# Patient Record
Sex: MALE | Race: WHITE | NOT HISPANIC OR LATINO | Employment: OTHER | URBAN - METROPOLITAN AREA
[De-identification: names, ages, dates, MRNs, and addresses within clinical notes are randomized per-mention and may not be internally consistent; named-entity substitution may affect disease eponyms.]

---

## 2017-04-24 ENCOUNTER — OFFICE VISIT (OUTPATIENT)
Dept: PODIATRY | Facility: CLINIC | Age: 42
End: 2017-04-24
Payer: OTHER GOVERNMENT

## 2017-04-24 VITALS — WEIGHT: 212.31 LBS | HEIGHT: 70 IN | BODY MASS INDEX: 30.39 KG/M2

## 2017-04-24 DIAGNOSIS — M92.61 HAGLUND'S DEFORMITY OF BOTH HEELS: ICD-10-CM

## 2017-04-24 DIAGNOSIS — M92.62 HAGLUND'S DEFORMITY OF BOTH HEELS: ICD-10-CM

## 2017-04-24 DIAGNOSIS — M79.671 HEEL PAIN, BILATERAL: Primary | ICD-10-CM

## 2017-04-24 DIAGNOSIS — M79.672 HEEL PAIN, BILATERAL: Primary | ICD-10-CM

## 2017-04-24 DIAGNOSIS — M21.6X9 EQUINUS DEFORMITY OF FOOT: ICD-10-CM

## 2017-04-24 PROCEDURE — 99204 OFFICE O/P NEW MOD 45 MIN: CPT | Mod: S$PBB,,, | Performed by: PODIATRIST

## 2017-04-24 PROCEDURE — 99999 PR PBB SHADOW E&M-NEW PATIENT-LVL III: CPT | Mod: PBBFAC,,, | Performed by: PODIATRIST

## 2017-04-24 PROCEDURE — 99203 OFFICE O/P NEW LOW 30 MIN: CPT | Mod: PBBFAC,PO | Performed by: PODIATRIST

## 2017-04-24 RX ORDER — MELOXICAM 15 MG/1
15 TABLET ORAL DAILY
COMMUNITY
Start: 2017-03-30 | End: 2019-01-31

## 2017-04-24 NOTE — PROGRESS NOTES
Subjective:      Patient ID: Joseph Vera is a 41 y.o. male.    Chief Complaint: Heel Pain (Hx. diag of heel spurs to both feet)  Patient presents to clinic with the chief complaint of pain in the Rt. Posterior heel with an onset > 1 year.  Describes pain as sharp and currently rates as a 4/10.  States symptoms are exacerbated with the first couple of steps after periods of rest.  Symptoms are alleviated with rest.  Denies definitive injury to the affected extremity.  Attempts to stretch the calf muscle on the affected side, as he was previously diagnosed with heel spurs.  Relates having a job where running is a part of his daily routine, further exacerbating current symptoms.   Denies any additional pedal complaints.  \    History reviewed. No pertinent past medical history.    History reviewed. No pertinent surgical history.    History reviewed. No pertinent family history.    Social History     Social History    Marital status:      Spouse name: N/A    Number of children: N/A    Years of education: N/A     Social History Main Topics    Smoking status: None    Smokeless tobacco: None    Alcohol use None    Drug use: None    Sexual activity: Not Asked     Other Topics Concern    None     Social History Narrative    None       Current Outpatient Prescriptions   Medication Sig Dispense Refill    meloxicam (MOBIC) 15 MG tablet Take 15 mg by mouth once daily.       No current facility-administered medications for this visit.        Review of patient's allergies indicates:  No Known Allergies    Review of Systems   Constitution: Negative for chills and fever.   Cardiovascular: Negative for claudication and leg swelling.   Skin: Negative for color change, dry skin and nail changes.   Musculoskeletal: Positive for myalgias. Negative for arthritis, joint pain and joint swelling.   Neurological: Negative for numbness and paresthesias.   Psychiatric/Behavioral: Negative for altered mental status.            Objective:      Physical Exam   Constitutional: He is oriented to person, place, and time. He appears well-developed and well-nourished. No distress.   Cardiovascular:   Pulses:       Dorsalis pedis pulses are 2+ on the right side, and 2+ on the left side.        Posterior tibial pulses are 2+ on the right side, and 2+ on the left side.   CFT <3 seconds bilateral.  Pedal hair growth present bilateral.   No varicosities noted bilateral.  No bilateral lower extremity edema.   Musculoskeletal: He exhibits tenderness. He exhibits no edema.   Muscle strength 5/5 in all muscle groups bilateral.  No tenderness nor crepitation with ROM of foot/ankle joints bilateral.  Osseous prominence noted to the posterior superior aspect of bilateral heel.  Pain with palpation of the posterior lateral aspect of the Rt. Calcaneus.  No pain at the insertion of the corresponding Achilles tendon nor palpable defect or dell noted within the tendon.  Bilateral pes planus foot type. Bilateral gastrocnemius equinus.      Neurological: He is alert and oriented to person, place, and time. He has normal strength. No sensory deficit.   Light touch intact bilateral.    Skin: Skin is warm, dry and intact. No abrasion, no bruising, no burn, no ecchymosis, no laceration, no lesion, no petechiae and no rash noted. He is not diaphoretic. No cyanosis or erythema. No pallor. Nails show no clubbing.   Pedal skin has normal turgor, temperature, and texture bilateral.  Toenails x 10 appear normotrophic. Examination of the skin reveals no evidence of significant maceration, rashes, open lesions, suspicious appearing nevi or other concerning lesions.              Assessment:       Encounter Diagnoses   Name Primary?    Heel pain, bilateral Yes    Equinus deformity of foot     Deyvi's deformity of both heels          Plan:       Joseph was seen today for heel pain.    Diagnoses and all orders for this visit:    Heel pain, bilateral    Equinus deformity of  foot    Deyvi's deformity of both heels      I counseled the patient on his conditions, their implications and medical management.    Given verbal and written instructions regarding stretching exercises to help reduce equinus deformity.     Fitted and dispensed heel lifts to be worn bilateral to offset equinus.     Advised to rest and ice the affected heel up to 20 minutes daily.     Instructed to take ibuprofen prn for pain. May also apply a topical analgesic to the posterior heel.      Avoidance of barefoot walking, flats, and slippers as this will exacerbate symptoms.     Avoidance of squatting, stooping, and running as these activities will exacerbate symptoms.    RTC in 6 weeks for follow up.    Theodore Olivia DPM

## 2017-04-24 NOTE — MR AVS SNAPSHOT
"    [facility]  [address] [facility] [address]  [place] [address]  Phone: [phone]                  Joseph Vera   2017 8:00 AM   Office Visit    Description:  Male : 1975   Provider:  Theodore Olivia DPM   Department:  [facility]           Reason for Visit     Heel Pain           Diagnoses this Visit        Comments    Heel pain, bilateral    -  Primary     Equinus deformity of foot         Deyvi's deformity of both heels                To Do List           Future Appointments        Provider Department Dept Phone    2017 8:00 AM Theodore Olivia DPM [facility] [phone]      Goals (5 Years of Data)     None      Follow-Up and Disposition     Return in about 6 weeks (around 2017).      [facility]     [facility] Nurse Care Line -  Assistance  Unless otherwise directed by your provider, please contact [facility], our nurse care line that is available for  assistance.     Registered nurses in the [facility] provide: appointment scheduling, clinical advisement, health education, and other advisory services.  Call: [phone] (toll free)               Medications           Message regarding Medications     Verify the changes and/or additions to your medication regime listed below are the same as discussed with your clinician today.  If any of these changes or additions are incorrect, please notify your healthcare provider.             Verify that the below list of medications is an accurate representation of the medications you are currently taking.  If none reported, the list may be blank. If incorrect, please contact your healthcare provider. Carry this list with you in case of emergency.           Current Medications     meloxicam (MOBIC) 15 MG tablet Take 15 mg by mouth once daily.           Clinical Reference Information           Your Vitals Were     Height Weight BMI          5' 10" (1.778 m) 96.3 kg (212 lb 4.9 oz) 30.46 " kg/m2        Allergies as of 4/24/2017     No Known Allergies      Immunizations Administered on Date of Encounter - 4/24/2017     None      [facility] Sign-Up     Activating your [facility] account is as easy as 1-2-3!     1) Visit [email or web address], select Sign Up Now, enter this activation code and your date of birth, then select Next.  [email or web address]  Expires: 6/8/2017  8:52 AM      2) Create a username and password to use when you visit [facility] in the future and select a security question in case you lose your password and select Next.    3) Enter your e-mail address and click Sign Up!    Additional Information  If you have questions, please e-mail [email or web address] or call [phone] to talk to our [facility] staff. Remember, [facility] is NOT to be used for urgent needs. For medical emergencies, dial 911.         Instructions    - Given verbal and written instructions regarding stretching exercises to help reduce equinus deformity.    - Recommended wearing supportive shoes and heel lifts to offload the affected heel.    - Rest and ice the affected heel up to 20 minutes daily.    - Instructed to take ibuprofen prn for pain.    - Avoid barefoot walking, flats, and slippers as this will exacerbate symptoms.    - Avoid squatting, stooping, and running as these activities will exacerbate symptoms.           Language Assistance Services     ATTENTION: Language assistance services are available, free of charge. Please call [phone].      ATENCIÓN: Si habla marta, tiene a calero disposición servicios gratuitos de asistencia lingüística. Llame al [phone].     CHÚ Ý: N?u b?n nói Ti?ng Vi?t, có các d?ch v? h? tr? ngôn ng? mi?n phí dành cho b?n. G?i s? [phone].         [facility] complies with applicable Federal civil rights laws and does not discriminate on the basis of race, color, national origin, age, disability, or sex.

## 2017-04-24 NOTE — LETTER
April 24, 2017      [facility]  [address] [facility]  [place] [address]  Phone: [phone]       Patient: Joseph Vera   YOB: 1975  Date of Visit: 04/24/2017    To Whom It May Concern:    Joseph Harris was at [facility] on 04/24/2017. He may return to work/school on 4/25/17 with restrictions. He is unable to run or perform high impact activities, as this will exacerbate a current musculoskeletal issue.  If you have any questions or concerns, or if I can be of further assistance, please do not hesitate to contact me.    Sincerely,    Theodore Olivia DPM

## 2017-04-24 NOTE — PATIENT INSTRUCTIONS
- Given verbal and written instructions regarding stretching exercises to help reduce equinus deformity.    - Recommended wearing supportive shoes and heel lifts to offload the affected heel.    - Rest and ice the affected heel up to 20 minutes daily.    - Instructed to take ibuprofen prn for pain.    - Avoid barefoot walking, flats, and slippers as this will exacerbate symptoms.    - Avoid squatting, stooping, and running as these activities will exacerbate symptoms.

## 2017-06-07 ENCOUNTER — OFFICE VISIT (OUTPATIENT)
Dept: PODIATRY | Facility: CLINIC | Age: 42
End: 2017-06-07
Payer: OTHER GOVERNMENT

## 2017-06-07 VITALS — HEIGHT: 70 IN | WEIGHT: 216.69 LBS | BODY MASS INDEX: 31.02 KG/M2

## 2017-06-07 DIAGNOSIS — M79.672 HEEL PAIN, BILATERAL: Primary | ICD-10-CM

## 2017-06-07 DIAGNOSIS — M92.61 HAGLUND'S DEFORMITY OF BOTH HEELS: ICD-10-CM

## 2017-06-07 DIAGNOSIS — M21.6X9 EQUINUS DEFORMITY OF FOOT: ICD-10-CM

## 2017-06-07 DIAGNOSIS — M92.62 HAGLUND'S DEFORMITY OF BOTH HEELS: ICD-10-CM

## 2017-06-07 DIAGNOSIS — M79.671 HEEL PAIN, BILATERAL: Primary | ICD-10-CM

## 2017-06-07 PROCEDURE — 99999 PR PBB SHADOW E&M-EST. PATIENT-LVL III: CPT | Mod: PBBFAC,,, | Performed by: PODIATRIST

## 2017-06-07 PROCEDURE — 99213 OFFICE O/P EST LOW 20 MIN: CPT | Mod: S$PBB,,, | Performed by: PODIATRIST

## 2017-06-07 PROCEDURE — 99213 OFFICE O/P EST LOW 20 MIN: CPT | Mod: PBBFAC,PO | Performed by: PODIATRIST

## 2017-06-07 NOTE — PATIENT INSTRUCTIONS
Recommend continuance of stretching, avoidance of barefoot walking, and wearing heel lifts in supportive shoes.    Recommend purchasing a night splint to serve as an adjunct to current stretching routine.    If symptoms have failed to resolve in 4 weeks, I will send you in a referral for Physical Therapy.

## 2017-06-07 NOTE — LETTER
June 7, 2017      [facility]  [address] [facility]  [place] [address]  Phone: [phone]       Patient: Joseph Vera   YOB: 1975  Date of Visit: 06/07/2017    To Whom It May Concern:    Joseph Harris was at [facility] on 06/07/2017. He may return to work/school on 6/7/17 with restrictions.  Patient still needs to avoid high impact activities, such as running and jumping, for at least 4 additional weeks.   If you have any questions or concerns, or if I can be of further assistance, please do not hesitate to contact me.    Sincerely,    Theodore Olivia DPM

## 2017-06-07 NOTE — PROGRESS NOTES
Subjective:      Patient ID: Joseph Vera is a 42 y.o. male.    Chief Complaint: Heel Pain (bilateral heel pain )  Patient presents to clinic for a 6 week follow up for bilateral posterior heel pain.  Currently rates pain symptoms as a 2/10.  States symptoms have improved with stretching and avoidance of high impact activities.  Has been able to ambulate mostly to tolerance.  Denies having any additional pedal complaints.        Review of Systems   Constitution: Negative for chills and fever.   Cardiovascular: Negative for claudication and leg swelling.   Skin: Negative for color change, dry skin and nail changes.   Musculoskeletal: Positive for myalgias. Negative for arthritis, joint pain and joint swelling.   Neurological: Negative for numbness and paresthesias.   Psychiatric/Behavioral: Negative for altered mental status.           Objective:      Physical Exam   Constitutional: He is oriented to person, place, and time. He appears well-developed and well-nourished. No distress.   Cardiovascular:   Pulses:       Dorsalis pedis pulses are 2+ on the right side, and 2+ on the left side.        Posterior tibial pulses are 2+ on the right side, and 2+ on the left side.   CFT <3 seconds bilateral.  Pedal hair growth present bilateral.   No varicosities noted bilateral.  No bilateral lower extremity edema.   Musculoskeletal: He exhibits tenderness. He exhibits no edema.   Muscle strength 5/5 in all muscle groups bilateral.  No tenderness nor crepitation with ROM of foot/ankle joints bilateral.  Osseous prominence noted to the posterior superior aspect of bilateral heel.  Pain with palpation of the posterior lateral aspect of bilateral calcaneus.  No pain at the insertion of the corresponding Achilles tendon nor palpable defect or dell noted within the tendon.  Bilateral pes planus foot type. Bilateral gastrocnemius equinus with modest improvement in dorsiflexion.      Neurological: He is alert and oriented to person,  place, and time. He has normal strength. No sensory deficit.   Light touch intact bilateral.    Skin: Skin is warm, dry and intact. No abrasion, no bruising, no burn, no ecchymosis, no laceration, no lesion, no petechiae and no rash noted. He is not diaphoretic. No cyanosis or erythema. No pallor. Nails show no clubbing.   Pedal skin has normal turgor, temperature, and texture bilateral.  Toenails x 10 appear normotrophic. Examination of the skin reveals no evidence of significant maceration, rashes, open lesions, suspicious appearing nevi or other concerning lesions.              Assessment:       Encounter Diagnoses   Name Primary?    Heel pain, bilateral Yes    Equinus deformity of foot     Deyvi's deformity of both heels          Plan:       Joseph was seen today for heel pain.    Diagnoses and all orders for this visit:    Heel pain, bilateral    Equinus deformity of foot    Deyvi's deformity of both heels      I counseled the patient on his conditions, their implications and medical management.    Advised to avoid high impact activities x 4 additional weeks.      Recommend continuance of stretching, avoidance of barefoot walking, and wearing heel lifts in supportive shoes.    Recommend purchasing a night splint to serve as an adjunct to current stretching routine.    If symptoms fail to resolve in 4 weeks, will refer to Physical Therapy.    RTC prn.    Theodore Olivia DPM

## 2019-01-03 ENCOUNTER — OFFICE VISIT (OUTPATIENT)
Dept: SURGERY | Facility: CLINIC | Age: 44
End: 2019-01-03
Payer: OTHER GOVERNMENT

## 2019-01-03 VITALS
BODY MASS INDEX: 30.95 KG/M2 | HEART RATE: 75 BPM | WEIGHT: 216.19 LBS | DIASTOLIC BLOOD PRESSURE: 92 MMHG | SYSTOLIC BLOOD PRESSURE: 144 MMHG | TEMPERATURE: 99 F | HEIGHT: 70 IN

## 2019-01-03 DIAGNOSIS — Z01.818 PRE-OP TESTING: Primary | ICD-10-CM

## 2019-01-03 PROCEDURE — 99244 PR OFFICE CONSULTATION,LEVEL IV: ICD-10-PCS | Mod: S$PBB,,, | Performed by: SURGERY

## 2019-01-03 PROCEDURE — 99999 PR PBB SHADOW E&M-EST. PATIENT-LVL III: CPT | Mod: PBBFAC,,, | Performed by: SURGERY

## 2019-01-03 PROCEDURE — 99213 OFFICE O/P EST LOW 20 MIN: CPT | Mod: PBBFAC | Performed by: SURGERY

## 2019-01-03 PROCEDURE — 99999 PR PBB SHADOW E&M-EST. PATIENT-LVL III: ICD-10-PCS | Mod: PBBFAC,,, | Performed by: SURGERY

## 2019-01-03 PROCEDURE — 99244 OFF/OP CNSLTJ NEW/EST MOD 40: CPT | Mod: S$PBB,,, | Performed by: SURGERY

## 2019-01-03 NOTE — LETTER
[facility]  [address]  [facility] [address]  Phone: [phone] January 6, 2019      Suki Gudino MD  [address]  [facility] [address]    Patient: Joseph Vera   MR Number: [id number]   YOB: 1975   Date of Visit: 1/3/2019     Dear Dr. Gudino:    Thank you for referring Joseph Vera to me for evaluation. Attached you will find relevant portions of my assessment and plan of care.    Patient with at least a T1b, 0.9 mm Breslow depth melanoma of the posterior distal left upper arm status post shave biopsy with a broadly positive deep margin.  Superficial spreading type, Bebeto level IV with 1 mitosis per mm squared.    Reccommended at least a 1 cm WLE with left axillary SLNB (injection only without pre-op lymphoscintigraphy). Consented and scheduled for the OR on 1-23-19.    If you have questions, please do not hesitate to call me. I look forward to following Joseph Vera along with you.    Sincerely,    Shaun Gaston MD  Medical Director, [facility]  Staff Attending Surgeon - Department of Surgery  [facility]  Associate Professor of Surgery  [employer]  [facility]    RLC/hcr

## 2019-01-03 NOTE — MEDICAL/APP STUDENT
Department of Surgery    REFERRING PROVIDER: Aaareferral Self  No address on file    CHIEF COMPLAINT: Malignant Melanoma    Subjective:      Joseph Vera is a 43 y.o. male referred for surgical evaluation of malignant melanoma on the left arm. Have been having the lesion for 2 months and presented to dermatologist Dr. Gudino from [facility] who biopsied the lesion. 12/17/2018 pathology report showed a malignant melanoma of 0.9mm (deep margins broadly involved), positive deep and peripheral margins, superficial spreading histology.            Denies any fever, chills, weight changes, nausea, vomiting or other physical complains.    Denies any significant heart, lung, liver diseases or coagulopathies.     FAMILY History:  Dad: Colon Cancer  Mom: [place]'s disease    No past medical history on file.  No past surgical history on file.  Current Outpatient Medications on File Prior to Visit   Medication Sig Dispense Refill    meloxicam (MOBIC) 15 MG tablet Take 15 mg by mouth once daily.       No current facility-administered medications on file prior to visit.      Social History     Socioeconomic History    Marital status:      Spouse name: Not on file    Number of children: Not on file    Years of education: Not on file    Highest education level: Not on file   Social Needs    Financial resource strain: Not on file    Food insecurity - worry: Not on file    Food insecurity - inability: Not on file    Transportation needs - medical: Not on file    Transportation needs - non-medical: Not on file   Occupational History    Not on file   Tobacco Use    Smoking status: Never Smoker    Smokeless tobacco: Never Used   Substance and Sexual Activity    Alcohol use: Not on file    Drug use: Not on file    Sexual activity: Not on file   Other Topics Concern    Not on file   Social History Narrative    Not on file     No family history on file.     Review of Systems  Review of  "Systems   Constitutional: Negative for activity change, appetite change, fatigue and fever.   Respiratory: Negative for cough, chest tightness and shortness of breath.    Cardiovascular: Negative for chest pain.   Gastrointestinal: Negative for abdominal distention, abdominal pain and constipation.   Neurological: Negative for dizziness, weakness and headaches.        Objective:   PHYSICAL EXAM:  BP (!) 144/92   Pulse 75   Temp 99 °F (37.2 °C)   Ht 5' 10" (1.778 m)   Wt 98.1 kg (216 lb 2.6 oz)   BMI 31.02 kg/m²     Physical Exam   Constitutional: He appears well-developed and well-nourished.   Eyes: Pupils are equal, round, and reactive to light.   Cardiovascular: Normal rate, regular rhythm and normal heart sounds.    Pulmonary/Chest: Effort normal and breath sounds normal. No tachypnea and no bradypnea. No respiratory distress.   Abdominal: Soft. Bowel sounds are normal.   Lymphadenopathy:        Head (right side): No submental, no submandibular, no preauricular, no posterior auricular and no occipital adenopathy present.        Head (left side): No submental, no submandibular, no preauricular, no posterior auricular and no occipital adenopathy present.        Right: No supraclavicular adenopathy present.        Left: No supraclavicular adenopathy present.   Skin:     On left arm posterior medial region near the elbow, there is a approx 1.7 cm, pink colored, irregular lesion. Non-ulcerated, not tender or warm.      Assessment:     Joseph Vera is a 43 y.o. male referred for surgical evaluation of malignant melanoma on the left arm     Plan:       +Scheduled 1/23/2018 malignant melanoma wide local excision and sentinel lymph node biopsy if needed  +Explain benefits and risk of malignant melanoma wide local excision   "

## 2019-01-03 NOTE — LETTER
January 6, 2019        Suki Gudino MD  [address]   [address]  [place] [address]             [facility]  [address]  [place] [address]  Phone: [phone]   Patient: Joseph Vera   MR Number: [id number]   YOB: 1975   Date of Visit: 1/3/2019       Dear Dr. Gudino:    Thank you for referring Joseph Vera to me for evaluation. Attached you will find relevant portions of my assessment and plan of care.    If you have questions, please do not hesitate to call me. I look forward to following Joseph Vera along with you.    Sincerely,      Shaun Gaston MD            CC  No Recipients    Enclosure

## 2019-01-04 ENCOUNTER — TELEPHONE (OUTPATIENT)
Dept: SURGERY | Facility: CLINIC | Age: 44
End: 2019-01-04

## 2019-01-07 ENCOUNTER — TELEPHONE (OUTPATIENT)
Dept: SURGERY | Facility: CLINIC | Age: 44
End: 2019-01-07

## 2019-01-07 NOTE — TELEPHONE ENCOUNTER
Returned patients call, no answer, left message. Patient does not need cxr and ekg only pre op blood work as scheduled.

## 2019-01-07 NOTE — TELEPHONE ENCOUNTER
Called patient, left message, he does not need the cxr or 12 lead ekg pre op, he only needs the ordered blood work as scheduled.

## 2019-01-07 NOTE — H&P (VIEW-ONLY)
REFERRING PROVIDER: Suki Gudino MD    CHIEF COMPLAINT: Malignant Melanoma     Subjective:       Joseph Vera is a 43 y.o. male referred for surgical evaluation of malignant melanoma on the left arm. Have been having the lesion for 2 months and presented to dermatologist Dr. Gudino from [facility], [facility] who biopsied the lesion. 12/17/2018 pathology report showed a malignant melanoma of 0.9mm (deep margins broadly involved), positive deep and peripheral margins, superficial spreading histology.             Denies any fever, chills, weight changes, nausea, vomiting or other physical complains.     Denies any significant heart, lung, liver diseases or coagulopathies.      FAMILY History:  Dad: Colon Cancer  Mom: Burnett's disease     No past medical history on file.  No past surgical history on file.  Current Outpatient Medications on File Prior to Visit   Medication Sig Dispense Refill    meloxicam (MOBIC) 15 MG tablet Take 15 mg by mouth once daily.          No current facility-administered medications on file prior to visit.       Social History               Socioeconomic History    Marital status:        Spouse name: Not on file    Number of children: Not on file    Years of education: Not on file    Highest education level: Not on file   Social Needs    Financial resource strain: Not on file    Food insecurity - worry: Not on file    Food insecurity - inability: Not on file    Transportation needs - medical: Not on file    Transportation needs - non-medical: Not on file   Occupational History    Not on file   Tobacco Use    Smoking status: Never Smoker    Smokeless tobacco: Never Used   Substance and Sexual Activity    Alcohol use: Not on file    Drug use: Not on file    Sexual activity: Not on file   Other Topics Concern    Not on file   Social History Narrative    Not on file         No family history on file.      Review of Systems  Review of Systems  "  Constitutional: Negative for activity change, appetite change, fatigue and fever.   Respiratory: Negative for cough, chest tightness and shortness of breath.    Cardiovascular: Negative for chest pain.   Gastrointestinal: Negative for abdominal distention, abdominal pain and constipation.   Neurological: Negative for dizziness, weakness and headaches.         Objective:   PHYSICAL EXAM:  BP (!) 144/92   Pulse 75   Temp 99 °F (37.2 °C)   Ht 5' 10" (1.778 m)   Wt 98.1 kg (216 lb 2.6 oz)   BMI 31.02 kg/m²      Physical Exam   Constitutional: He appears well-developed and well-nourished.   Eyes: Pupils are equal, round, and reactive to light.   Cardiovascular: Normal rate, regular rhythm and normal heart sounds.    Pulmonary/Chest: Effort normal and breath sounds normal. No tachypnea and no bradypnea. No respiratory distress.   Abdominal: Soft. Bowel sounds are normal.   Lymphadenopathy:        Head (right side): No submental, no submandibular, no preauricular, no posterior auricular and no occipital adenopathy present.        Head (left side): No submental, no submandibular, no preauricular, no posterior auricular and no occipital adenopathy present.        Right: No supraclavicular adenopathy present.        Left: No supraclavicular adenopathy present.   Skin:     On left arm posterior medial region near the elbow, there is a approx 1.7 cm, pink colored, irregular lesion. Non-ulcerated, not tender or warm.       Assessment:      Joseph Vera is a 43 y.o. male referred for surgical evaluation of malignant melanoma on the left arm      Plan:       +Scheduled 1/23/2018 malignant melanoma wide local excision and sentinel lymph node biopsy if needed  +Explain benefits and risk of malignant melanoma wide local excision.    I have personally taken the history and examined this patient and agree with the note as stated above.  Patient with at least a T1b, 0.9 mm Breslow depth melanoma of the posterior distal left upper " arm s/p shave biopsy with a broadly positive deep margin.  Superficial spreading type, Bebeto level IV with 1 mitosis per mm squared.  Rec at least a 1 cm WLE with left axillary SLNB (injection only without pre-op lymphoscintigraphy)  Consented and scheduled for the OR on 1-23-19.

## 2019-01-07 NOTE — PROGRESS NOTES
REFERRING PROVIDER: Suki Gudino MD    CHIEF COMPLAINT: Malignant Melanoma     Subjective:       Joseph Vera is a 43 y.o. male referred for surgical evaluation of malignant melanoma on the left arm. Have been having the lesion for 2 months and presented to dermatologist Dr. Gudino from [facility], [facility] who biopsied the lesion. 12/17/2018 pathology report showed a malignant melanoma of 0.9mm (deep margins broadly involved), positive deep and peripheral margins, superficial spreading histology.             Denies any fever, chills, weight changes, nausea, vomiting or other physical complains.     Denies any significant heart, lung, liver diseases or coagulopathies.      FAMILY History:  Dad: Colon Cancer  Mom: McCracken's disease     No past medical history on file.  No past surgical history on file.  Current Outpatient Medications on File Prior to Visit   Medication Sig Dispense Refill    meloxicam (MOBIC) 15 MG tablet Take 15 mg by mouth once daily.          No current facility-administered medications on file prior to visit.       Social History               Socioeconomic History    Marital status:        Spouse name: Not on file    Number of children: Not on file    Years of education: Not on file    Highest education level: Not on file   Social Needs    Financial resource strain: Not on file    Food insecurity - worry: Not on file    Food insecurity - inability: Not on file    Transportation needs - medical: Not on file    Transportation needs - non-medical: Not on file   Occupational History    Not on file   Tobacco Use    Smoking status: Never Smoker    Smokeless tobacco: Never Used   Substance and Sexual Activity    Alcohol use: Not on file    Drug use: Not on file    Sexual activity: Not on file   Other Topics Concern    Not on file   Social History Narrative    Not on file         No family history on file.      Review of Systems  Review of Systems  "  Constitutional: Negative for activity change, appetite change, fatigue and fever.   Respiratory: Negative for cough, chest tightness and shortness of breath.    Cardiovascular: Negative for chest pain.   Gastrointestinal: Negative for abdominal distention, abdominal pain and constipation.   Neurological: Negative for dizziness, weakness and headaches.         Objective:   PHYSICAL EXAM:  BP (!) 144/92   Pulse 75   Temp 99 °F (37.2 °C)   Ht 5' 10" (1.778 m)   Wt 98.1 kg (216 lb 2.6 oz)   BMI 31.02 kg/m²      Physical Exam   Constitutional: He appears well-developed and well-nourished.   Eyes: Pupils are equal, round, and reactive to light.   Cardiovascular: Normal rate, regular rhythm and normal heart sounds.    Pulmonary/Chest: Effort normal and breath sounds normal. No tachypnea and no bradypnea. No respiratory distress.   Abdominal: Soft. Bowel sounds are normal.   Lymphadenopathy:        Head (right side): No submental, no submandibular, no preauricular, no posterior auricular and no occipital adenopathy present.        Head (left side): No submental, no submandibular, no preauricular, no posterior auricular and no occipital adenopathy present.        Right: No supraclavicular adenopathy present.        Left: No supraclavicular adenopathy present.   Skin:     On left arm posterior medial region near the elbow, there is a approx 1.7 cm, pink colored, irregular lesion. Non-ulcerated, not tender or warm.       Assessment:      Joseph Vera is a 43 y.o. male referred for surgical evaluation of malignant melanoma on the left arm      Plan:       +Scheduled 1/23/2018 malignant melanoma wide local excision and sentinel lymph node biopsy if needed  +Explain benefits and risk of malignant melanoma wide local excision.    I have personally taken the history and examined this patient and agree with the note as stated above.  Patient with at least a T1b, 0.9 mm Breslow depth melanoma of the posterior distal left upper " arm s/p shave biopsy with a broadly positive deep margin.  Superficial spreading type, Bebeto level IV with 1 mitosis per mm squared.  Rec at least a 1 cm WLE with left axillary SLNB (injection only without pre-op lymphoscintigraphy)  Consented and scheduled for the OR on 1-23-19.

## 2019-01-07 NOTE — TELEPHONE ENCOUNTER
----- Message from Cheryl Wilhelm sent at 1/7/2019  9:36 AM CST -----  Contact: Patient   Needs Advice    Reason for call: Patient states that he was notified by someone in this office that he needs a referral, however patient states that he has already had one sent in         Communication Preference: [phone] or [phone]  Additional Information: please contact the caller to address this issue and give clarification

## 2019-01-08 DIAGNOSIS — C43.62 MELANOMA OF LEFT UPPER ARM: Primary | ICD-10-CM

## 2019-01-09 DIAGNOSIS — C43.62 MELANOMA OF LEFT UPPER ARM: Primary | ICD-10-CM

## 2019-01-10 ENCOUNTER — LAB VISIT (OUTPATIENT)
Dept: LAB | Facility: HOSPITAL | Age: 44
End: 2019-01-10
Attending: SURGERY
Payer: OTHER GOVERNMENT

## 2019-01-10 DIAGNOSIS — Z01.818 PRE-OP TESTING: ICD-10-CM

## 2019-01-10 LAB
BASOPHILS # BLD AUTO: 0.03 K/UL
BASOPHILS NFR BLD: 0.4 %
DIFFERENTIAL METHOD: NORMAL
EOSINOPHIL # BLD AUTO: 0.1 K/UL
EOSINOPHIL NFR BLD: 0.8 %
ERYTHROCYTE [DISTWIDTH] IN BLOOD BY AUTOMATED COUNT: 11.8 %
HCT VFR BLD AUTO: 45.1 %
HGB BLD-MCNC: 15.3 G/DL
IMM GRANULOCYTES # BLD AUTO: 0.04 K/UL
IMM GRANULOCYTES NFR BLD AUTO: 0.5 %
LYMPHOCYTES # BLD AUTO: 3.2 K/UL
LYMPHOCYTES NFR BLD: 42.3 %
MCH RBC QN AUTO: 30.1 PG
MCHC RBC AUTO-ENTMCNC: 33.9 G/DL
MCV RBC AUTO: 89 FL
MONOCYTES # BLD AUTO: 0.7 K/UL
MONOCYTES NFR BLD: 9.1 %
NEUTROPHILS # BLD AUTO: 3.6 K/UL
NEUTROPHILS NFR BLD: 46.9 %
NRBC BLD-RTO: 0 /100 WBC
PLATELET # BLD AUTO: 306 K/UL
PMV BLD AUTO: 9.8 FL
RBC # BLD AUTO: 5.09 M/UL
WBC # BLD AUTO: 7.58 K/UL

## 2019-01-10 PROCEDURE — 85025 COMPLETE CBC W/AUTO DIFF WBC: CPT

## 2019-01-10 PROCEDURE — 36415 COLL VENOUS BLD VENIPUNCTURE: CPT

## 2019-01-22 ENCOUNTER — TELEPHONE (OUTPATIENT)
Dept: SURGERY | Facility: CLINIC | Age: 44
End: 2019-01-22

## 2019-01-22 ENCOUNTER — ANESTHESIA EVENT (OUTPATIENT)
Dept: SURGERY | Facility: HOSPITAL | Age: 44
End: 2019-01-22
Payer: OTHER GOVERNMENT

## 2019-01-22 NOTE — PRE-PROCEDURE INSTRUCTIONS
Anesthesia review complete, medication instructions given NPO past midnight, Bathe with hibiclens or dial soap the night before & am of surgery. Put nothing on skin -  lotion, deodorant, powder  No metal or jewelry & no valuables. Instructions left on phone for pt

## 2019-01-22 NOTE — TELEPHONE ENCOUNTER
Spoke with pt regarding surgery, pt advised to arrive to [facility] at 1230 for 1450 surgery, pt verbalized understanding, pre-op education reinforced, all questions answered at this time, pt given reassurance

## 2019-01-23 ENCOUNTER — HOSPITAL ENCOUNTER (OUTPATIENT)
Facility: HOSPITAL | Age: 44
Discharge: HOME | End: 2019-01-23
Attending: SURGERY | Admitting: SURGERY
Payer: OTHER GOVERNMENT

## 2019-01-23 ENCOUNTER — ANESTHESIA (OUTPATIENT)
Dept: SURGERY | Facility: HOSPITAL | Age: 44
End: 2019-01-23
Payer: OTHER GOVERNMENT

## 2019-01-23 ENCOUNTER — HOSPITAL ENCOUNTER (OUTPATIENT)
Dept: RADIOLOGY | Facility: HOSPITAL | Age: 44
Discharge: HOME | End: 2019-01-23
Attending: SURGERY | Admitting: SURGERY
Payer: OTHER GOVERNMENT

## 2019-01-23 VITALS
SYSTOLIC BLOOD PRESSURE: 156 MMHG | DIASTOLIC BLOOD PRESSURE: 91 MMHG | TEMPERATURE: 98 F | WEIGHT: 210 LBS | RESPIRATION RATE: 14 BRPM | HEIGHT: 70 IN | BODY MASS INDEX: 30.06 KG/M2 | HEART RATE: 86 BPM | OXYGEN SATURATION: 98 %

## 2019-01-23 DIAGNOSIS — C43.9 MELANOMA: Primary | ICD-10-CM

## 2019-01-23 DIAGNOSIS — C43.62 MELANOMA OF LEFT UPPER ARM: ICD-10-CM

## 2019-01-23 PROCEDURE — 01610 ANES ALL PX NRV MUSC SHO&AX: CPT | Performed by: SURGERY

## 2019-01-23 PROCEDURE — 38525 PR BIOPSY/REM LYMPH NODES, AXILLARY: ICD-10-PCS | Mod: LT,,, | Performed by: SURGERY

## 2019-01-23 PROCEDURE — 71000015 HC POSTOP RECOV 1ST HR: Performed by: SURGERY

## 2019-01-23 PROCEDURE — 12034 INTMD RPR S/TR/EXT 7.6-12.5: CPT | Mod: 59,51,LT, | Performed by: SURGERY

## 2019-01-23 PROCEDURE — 63600175 PHARM REV CODE 636 W HCPCS: Performed by: STUDENT IN AN ORGANIZED HEALTH CARE EDUCATION/TRAINING PROGRAM

## 2019-01-23 PROCEDURE — 12034 PR LAYR CLOS WND TRUNK,ARM,LEG 7.6-12.5 CM: ICD-10-PCS | Mod: 59,51,LT, | Performed by: SURGERY

## 2019-01-23 PROCEDURE — 88305 TISSUE EXAM BY PATHOLOGIST: CPT | Performed by: PATHOLOGY

## 2019-01-23 PROCEDURE — D9220A PRA ANESTHESIA: ICD-10-PCS | Mod: CRNA,,, | Performed by: NURSE ANESTHETIST, CERTIFIED REGISTERED

## 2019-01-23 PROCEDURE — 37000009 HC ANESTHESIA EA ADD 15 MINS: Performed by: SURGERY

## 2019-01-23 PROCEDURE — 88305 TISSUE EXAM BY PATHOLOGIST: CPT | Mod: 26,,, | Performed by: PATHOLOGY

## 2019-01-23 PROCEDURE — 88342 TISSUE SPECIMEN TO PATHOLOGY - SURGERY: ICD-10-PCS | Mod: 26,,, | Performed by: PATHOLOGY

## 2019-01-23 PROCEDURE — 88307 TISSUE SPECIMEN TO PATHOLOGY - SURGERY: ICD-10-PCS | Mod: 26,,, | Performed by: PATHOLOGY

## 2019-01-23 PROCEDURE — 25000003 PHARM REV CODE 250: Performed by: SURGERY

## 2019-01-23 PROCEDURE — 25000003 PHARM REV CODE 250: Performed by: STUDENT IN AN ORGANIZED HEALTH CARE EDUCATION/TRAINING PROGRAM

## 2019-01-23 PROCEDURE — 88342 IMHCHEM/IMCYTCHM 1ST ANTB: CPT | Mod: 26,,, | Performed by: PATHOLOGY

## 2019-01-23 PROCEDURE — 27000221 HC OXYGEN, UP TO 24 HOURS

## 2019-01-23 PROCEDURE — D9220A PRA ANESTHESIA: Mod: CRNA,,, | Performed by: NURSE ANESTHETIST, CERTIFIED REGISTERED

## 2019-01-23 PROCEDURE — 38525 BIOPSY/REMOVAL LYMPH NODES: CPT | Mod: LT,,, | Performed by: SURGERY

## 2019-01-23 PROCEDURE — 88341 TISSUE SPECIMEN TO PATHOLOGY - SURGERY: ICD-10-PCS | Mod: 26,,, | Performed by: PATHOLOGY

## 2019-01-23 PROCEDURE — 36000706: Performed by: SURGERY

## 2019-01-23 PROCEDURE — A9520 TC99 TILMANOCEPT DIAG 0.5MCI: HCPCS

## 2019-01-23 PROCEDURE — 71000033 HC RECOVERY, INTIAL HOUR: Performed by: SURGERY

## 2019-01-23 PROCEDURE — 88341 IMHCHEM/IMCYTCHM EA ADD ANTB: CPT | Mod: 26,,, | Performed by: PATHOLOGY

## 2019-01-23 PROCEDURE — 94761 N-INVAS EAR/PLS OXIMETRY MLT: CPT | Mod: 59

## 2019-01-23 PROCEDURE — 63600175 PHARM REV CODE 636 W HCPCS: Performed by: NURSE ANESTHETIST, CERTIFIED REGISTERED

## 2019-01-23 PROCEDURE — 27201423 OPTIME MED/SURG SUP & DEVICES STERILE SUPPLY: Performed by: SURGERY

## 2019-01-23 PROCEDURE — D9220A PRA ANESTHESIA: ICD-10-PCS | Mod: ANES,,, | Performed by: ANESTHESIOLOGY

## 2019-01-23 PROCEDURE — 88307 TISSUE EXAM BY PATHOLOGIST: CPT | Mod: 26,,, | Performed by: PATHOLOGY

## 2019-01-23 PROCEDURE — 11604 EXC TR-EXT MAL+MARG 3.1-4 CM: CPT | Mod: 51,,, | Performed by: SURGERY

## 2019-01-23 PROCEDURE — 25000003 PHARM REV CODE 250: Performed by: NURSE ANESTHETIST, CERTIFIED REGISTERED

## 2019-01-23 PROCEDURE — 88305 TISSUE SPECIMEN TO PATHOLOGY - SURGERY: ICD-10-PCS | Mod: 26,,, | Performed by: PATHOLOGY

## 2019-01-23 PROCEDURE — 63600175 PHARM REV CODE 636 W HCPCS: Mod: JG | Performed by: SURGERY

## 2019-01-23 PROCEDURE — 63600175 PHARM REV CODE 636 W HCPCS: Performed by: ANESTHESIOLOGY

## 2019-01-23 PROCEDURE — D9220A PRA ANESTHESIA: Mod: ANES,,, | Performed by: ANESTHESIOLOGY

## 2019-01-23 PROCEDURE — 37000008 HC ANESTHESIA 1ST 15 MINUTES: Performed by: SURGERY

## 2019-01-23 PROCEDURE — 36000707: Performed by: SURGERY

## 2019-01-23 PROCEDURE — 11604 PR EXC SKIN MALIG 3.1-4 CM TRUNK,ARM,LEG: ICD-10-PCS | Mod: 51,,, | Performed by: SURGERY

## 2019-01-23 RX ORDER — ONDANSETRON 2 MG/ML
INJECTION INTRAMUSCULAR; INTRAVENOUS
Status: DISCONTINUED | OUTPATIENT
Start: 2019-01-23 | End: 2019-01-23

## 2019-01-23 RX ORDER — FENTANYL CITRATE 50 UG/ML
INJECTION, SOLUTION INTRAMUSCULAR; INTRAVENOUS
Status: DISCONTINUED | OUTPATIENT
Start: 2019-01-23 | End: 2019-01-23

## 2019-01-23 RX ORDER — CEFAZOLIN SODIUM 1 G/3ML
2 INJECTION, POWDER, FOR SOLUTION INTRAMUSCULAR; INTRAVENOUS
Status: COMPLETED | OUTPATIENT
Start: 2019-01-23 | End: 2019-01-23

## 2019-01-23 RX ORDER — HYDROCODONE BITARTRATE AND ACETAMINOPHEN 5; 325 MG/1; MG/1
1-2 TABLET ORAL
Qty: 31 TABLET | Refills: 0 | Status: SHIPPED | OUTPATIENT
Start: 2019-01-23 | End: 2019-01-31

## 2019-01-23 RX ORDER — SODIUM CHLORIDE 0.9 % (FLUSH) 0.9 %
3 SYRINGE (ML) INJECTION
Status: DISCONTINUED | OUTPATIENT
Start: 2019-01-23 | End: 2019-01-23 | Stop reason: HOSPADM

## 2019-01-23 RX ORDER — ISOSULFAN BLUE 50 MG/5ML
INJECTION, SOLUTION SUBCUTANEOUS
Status: DISCONTINUED | OUTPATIENT
Start: 2019-01-23 | End: 2019-01-23 | Stop reason: HOSPADM

## 2019-01-23 RX ORDER — HYDROMORPHONE HYDROCHLORIDE 1 MG/ML
0.2 INJECTION, SOLUTION INTRAMUSCULAR; INTRAVENOUS; SUBCUTANEOUS EVERY 5 MIN PRN
Status: DISCONTINUED | OUTPATIENT
Start: 2019-01-23 | End: 2019-01-23 | Stop reason: HOSPADM

## 2019-01-23 RX ORDER — GLYCOPYRROLATE 0.2 MG/ML
INJECTION INTRAMUSCULAR; INTRAVENOUS
Status: DISCONTINUED | OUTPATIENT
Start: 2019-01-23 | End: 2019-01-23

## 2019-01-23 RX ORDER — HYDROCODONE BITARTRATE AND ACETAMINOPHEN 5; 325 MG/1; MG/1
1 TABLET ORAL EVERY 6 HOURS PRN
Status: DISCONTINUED | OUTPATIENT
Start: 2019-01-23 | End: 2019-01-23 | Stop reason: HOSPADM

## 2019-01-23 RX ORDER — LIDOCAINE HCL/PF 100 MG/5ML
SYRINGE (ML) INTRAVENOUS
Status: DISCONTINUED | OUTPATIENT
Start: 2019-01-23 | End: 2019-01-23

## 2019-01-23 RX ORDER — LIDOCAINE HYDROCHLORIDE 10 MG/ML
1 INJECTION, SOLUTION EPIDURAL; INFILTRATION; INTRACAUDAL; PERINEURAL ONCE
Status: DISCONTINUED | OUTPATIENT
Start: 2019-01-23 | End: 2019-01-23 | Stop reason: HOSPADM

## 2019-01-23 RX ORDER — MIDAZOLAM HYDROCHLORIDE 1 MG/ML
INJECTION, SOLUTION INTRAMUSCULAR; INTRAVENOUS
Status: DISCONTINUED | OUTPATIENT
Start: 2019-01-23 | End: 2019-01-23

## 2019-01-23 RX ORDER — SODIUM CHLORIDE 9 MG/ML
INJECTION, SOLUTION INTRAVENOUS CONTINUOUS
Status: DISCONTINUED | OUTPATIENT
Start: 2019-01-23 | End: 2019-01-23 | Stop reason: HOSPADM

## 2019-01-23 RX ORDER — DEXAMETHASONE SODIUM PHOSPHATE 4 MG/ML
INJECTION, SOLUTION INTRA-ARTICULAR; INTRALESIONAL; INTRAMUSCULAR; INTRAVENOUS; SOFT TISSUE
Status: DISCONTINUED | OUTPATIENT
Start: 2019-01-23 | End: 2019-01-23

## 2019-01-23 RX ORDER — PROPOFOL 10 MG/ML
VIAL (ML) INTRAVENOUS
Status: DISCONTINUED | OUTPATIENT
Start: 2019-01-23 | End: 2019-01-23

## 2019-01-23 RX ORDER — HYDROCODONE BITARTRATE AND ACETAMINOPHEN 5; 325 MG/1; MG/1
TABLET ORAL
Status: DISCONTINUED
Start: 2019-01-23 | End: 2019-01-23 | Stop reason: HOSPADM

## 2019-01-23 RX ADMIN — SODIUM CHLORIDE 10 ML/HR: 0.9 INJECTION, SOLUTION INTRAVENOUS at 02:01

## 2019-01-23 RX ADMIN — PROPOFOL 200 MG: 10 INJECTION, EMULSION INTRAVENOUS at 03:01

## 2019-01-23 RX ADMIN — CEFAZOLIN 2 G: 330 INJECTION, POWDER, FOR SOLUTION INTRAMUSCULAR; INTRAVENOUS at 03:01

## 2019-01-23 RX ADMIN — FENTANYL CITRATE 25 MCG: 50 INJECTION, SOLUTION INTRAMUSCULAR; INTRAVENOUS at 03:01

## 2019-01-23 RX ADMIN — MIDAZOLAM HYDROCHLORIDE 2 MG: 1 INJECTION, SOLUTION INTRAMUSCULAR; INTRAVENOUS at 03:01

## 2019-01-23 RX ADMIN — DEXAMETHASONE SODIUM PHOSPHATE 8 MG: 4 INJECTION, SOLUTION INTRAMUSCULAR; INTRAVENOUS at 03:01

## 2019-01-23 RX ADMIN — FENTANYL CITRATE 50 MCG: 50 INJECTION, SOLUTION INTRAMUSCULAR; INTRAVENOUS at 04:01

## 2019-01-23 RX ADMIN — HYDROMORPHONE HYDROCHLORIDE 0.2 MG: 1 INJECTION, SOLUTION INTRAMUSCULAR; INTRAVENOUS; SUBCUTANEOUS at 06:01

## 2019-01-23 RX ADMIN — LIDOCAINE HYDROCHLORIDE 100 MG: 20 INJECTION, SOLUTION INTRAVENOUS at 03:01

## 2019-01-23 RX ADMIN — SODIUM CHLORIDE, SODIUM GLUCONATE, SODIUM ACETATE, POTASSIUM CHLORIDE, MAGNESIUM CHLORIDE, SODIUM PHOSPHATE, DIBASIC, AND POTASSIUM PHOSPHATE: .53; .5; .37; .037; .03; .012; .00082 INJECTION, SOLUTION INTRAVENOUS at 03:01

## 2019-01-23 RX ADMIN — ONDANSETRON 4 MG: 2 INJECTION INTRAMUSCULAR; INTRAVENOUS at 03:01

## 2019-01-23 RX ADMIN — GLYCOPYRROLATE 0.2 MG: 0.2 INJECTION, SOLUTION INTRAMUSCULAR; INTRAVENOUS at 03:01

## 2019-01-23 RX ADMIN — HYDROCODONE BITARTRATE AND ACETAMINOPHEN 1 TABLET: 5; 325 TABLET ORAL at 05:01

## 2019-01-23 RX ADMIN — PROPOFOL 50 MG: 10 INJECTION, EMULSION INTRAVENOUS at 03:01

## 2019-01-23 NOTE — TRANSFER OF CARE
"Anesthesia Transfer of Care Note    Patient: Joseph Vera    Procedure(s) Performed: Procedure(s) (LRB):  EXCISION-WIDE LOCAL left upper arm just proximal to elbow with left axillary lymph node biopsy. (Left)  BIOPSY, LYMPH NODE, SENTINEL left axillary (Left)    Patient location: PACU    Anesthesia Type: general    Transport from OR: Transported from OR on 6-10 L/min O2 by face mask with adequate spontaneous ventilation    Post pain: adequate analgesia    Post assessment: tolerated procedure well and no apparent anesthetic complications    Post vital signs: stable    Level of consciousness: sedated    Nausea/Vomiting: no nausea/vomiting    Complications: Patient airway obstructed in PACU with oral airway in place. Ambu bag used and nasal airway placed successfully. Dr. Sanchez and RT at bedside. Patient VSS currently.    Transfer of care protocol was followedComments: Upon arrival to PACU patient airway obstructed with oral airway in place. Ambu bag used to assist and nasal airway placed successfully. Dr. Sanchez and RT at bedside. Patient VSS currently.      Last vitals:   Visit Vitals  BP (!) 120/58   Pulse (!) 115   Temp 36.2 °C (97.2 °F) (Temporal)   Resp 20   Ht 5' 10" (1.778 m)   Wt 95.3 kg (210 lb)   SpO2 99%   BMI 30.13 kg/m²     "

## 2019-01-23 NOTE — BRIEF OP NOTE
[facility]  Brief Operative Note     SUMMARY     Surgery Date: 1/23/2019     Surgeon(s) and Role:     * Shaun Gaston MD - Primary     * Lenard Pettit MD - Resident - Assisting        Pre-op Diagnosis:  Melanoma of left upper arm [C43.62]    Post-op Diagnosis:  Post-Op Diagnosis Codes:     * Melanoma of left upper arm [C43.62]    Procedure(s) (LRB):  EXCISION-WIDE LOCAL left upper arm just proximal to elbow with left axillary lymph node biopsy. (Left)  BIOPSY, LYMPH NODE, SENTINEL left axillary (Left)    Anesthesia: General    Description of the findings of the procedure: Left upper extremity wide local incision with sentinel lymph node biopsy     Findings/Key Components: Left upper extremity wide local incision with sentinel lymph node biopsy    Estimated Blood Loss: * No values recorded between 1/23/2019  3:39 PM and 1/23/2019  5:12 PM *         Specimens:   Specimen (12h ago, onward)    Start     Ordered    01/23/19 1609  Specimen to Pathology - Surgery  Once     Comments:  1. Wide local excision T1a melanoma, short superior, long anterior- permanent2. Left Axillary [place] Node #1 hot, blue, count 200- permanent3. Left Axillary [place] Node #2 Hot, blue, count 300- permanent     Start Status   01/23/19 1609 Collected (01/23/19 1616)       01/23/19 1615          Discharge Note    SUMMARY     Admit Date: 1/23/2019    Discharge Date and Time:  01/23/2019 5:22 PM    Hospital Course (synopsis of major diagnoses, care, treatment, and services provided during the course of the hospital stay): Successful outpatient procedure.      Final Diagnosis: Post-Op Diagnosis Codes:     * Melanoma of left upper arm [C43.62]    Disposition: Home or Self Care    Follow Up/Patient Instructions:     Medications:  Reconciled Home Medications:      Medication List      START taking these medications    HYDROcodone-acetaminophen 5-325 mg per tablet  Commonly known as:  NORCO  Take 1-2 tablets by mouth  every 4 to 6 hours as needed for Pain.        CONTINUE taking these medications    meloxicam 15 MG tablet  Commonly known as:  MOBIC  Take 15 mg by mouth once daily.          Discharge Procedure Orders   Notify your health care provider if you experience any of the following:  temperature >100.4     Notify your health care provider if you experience any of the following:  persistent nausea and vomiting or diarrhea     Notify your health care provider if you experience any of the following:  severe uncontrolled pain     Notify your health care provider if you experience any of the following:  redness, tenderness, or signs of infection (pain, swelling, redness, odor or green/yellow discharge around incision site)     Notify your health care provider if you experience any of the following:  difficulty breathing or increased cough     Notify your health care provider if you experience any of the following:  severe persistent headache     Notify your health care provider if you experience any of the following:  worsening rash     Notify your health care provider if you experience any of the following:  persistent dizziness, light-headedness, or visual disturbances     Notify your health care provider if you experience any of the following:  increased confusion or weakness     Remove dressing in 48 hours   Scheduling Instructions: Remove outer dressings in 48 hours. Ok to shower in 48 hours. Do not soak incisions in standing water/tub for 2 weeks. Steri Strips over axillary incision will fall off on their own over the next 7-10 days. The black sutures over the arm incision will be removed in clinic.     Activity as tolerated

## 2019-01-23 NOTE — ANESTHESIA PREPROCEDURE EVALUATION
2019    Pre-operative evaluation for EXCISION-WIDE LOCAL left upper arm just proximal to elbow with left axillary lymph node biopsy. (Left), BIOPSY, LYMPH NODE, SENTINEL left axillary (Left)    Chief Complaint:    PMH:    History reviewed. No pertinent surgical history.      Vital Signs Range (Last 24H):  Temp:  [36.7 °C (98.1 °F)]   Pulse:  [66]   Resp:  [18]   BP: (122)/(82)   SpO2:  [98 %]       CBC:     Recent Labs   Lab 01/10/19  1455   WBC 7.58   RBC 5.09   HGB 15.3   HCT 45.1      MCV 89   MCH 30.1   MCHC 33.9       CMP: No results for input(s): NA, K, CL, CO2, BUN, CREATININE, GLU, MG, PHOS, CALCIUM, ALBUMIN, PROT, ALKPHOS, ALT, AST, BILITOT in the last 720 hours.    INR:  No results for input(s): PT, INR, PROTIME, APTT in the last 720 hours.      Diagnostic Studies:      EKD Echo:      Anesthesia Evaluation    I have reviewed the Patient Summary Reports.     I have reviewed the Medications.     Review of Systems  Anesthesia Hx:  No problems with previous Anesthesia Denies Hx of Anesthetic complications  Neg history of prior surgery. Denies Family Hx of Anesthesia complications.   Denies Personal Hx of Anesthesia complications.   Cardiovascular:   Denies MI.  Denies CAD.     Denies Angina.        Pulmonary:   Denies Pneumonia Denies COPD.  Denies Asthma.  Denies Shortness of breath.    Renal/:   Denies Chronic Renal Disease.     Hepatic/GI:   Denies GERD.    Neurological:   Denies TIA. Denies CVA.    Endocrine:   Denies Diabetes.        Physical Exam  General:  Well nourished    Airway/Jaw/Neck:  Airway Findings: Mouth Opening: Normal Tongue: Normal  General Airway Assessment: Adult  Mallampati: II  Improves to I with phonation.  TM Distance: Normal, at least 6 cm  Jaw/Neck Findings:  Micrognathia: Negative Mandibular Fracture: Negative    Neck ROM: Normal ROM      Eyes/Ears/Nose:  EYES/EARS/NOSE FINDINGS: Normal   Dental:  Dental Findings: In tact   Chest/Lungs:  Chest/Lungs Findings: Clear to auscultation, Normal Respiratory Rate     Heart/Vascular:  Heart Findings: Rhythm: Regular Rhythm        Mental Status:  Mental Status Findings: Normal        Anesthesia Plan  Type of Anesthesia, risks & benefits discussed:  Anesthesia Type:  general  Patient's Preference:   Intra-op Monitoring Plan:   Intra-op Monitoring Plan Comments:   Post Op Pain Control Plan: multimodal analgesia  Post Op Pain Control Plan Comments:   Induction:   IV  Beta Blocker:  Patient is not currently on a Beta-Blocker (No further documentation required).       Informed Consent: Patient understands risks and agrees with Anesthesia plan.  Questions answered. Anesthesia consent signed with patient.  ASA Score: 2     Day of Surgery Review of History & Physical:    H&P update referred to the surgeon.         Ready For Surgery From Anesthesia Perspective.

## 2019-01-23 NOTE — PROGRESS NOTES
Pt rec'd in pacu [id number]. Began to cough/ obstruct. Ambu assist started and Dr. Sanchez to bedside. Shortly after, patient improved, stopped coughing and began breathing smoothly and effectively. Spo2 = 98% on room air. Will continue to monitor.

## 2019-01-24 NOTE — PLAN OF CARE
Discharge instructions reviewed with pt and wife. Understanding verbalized. No complaints of pain reported. prescriptions filled in pharmacy by wife. Pt able to tolerate po intake. To be transported to car by PCT.

## 2019-01-24 NOTE — DISCHARGE INSTRUCTIONS
Remove dressing in 48 hours   Scheduling Instructions: Remove outer dressings in 48 hours. Ok to shower in 48 hours. Do not soak incisions in standing water/tub for 2 weeks. Steri Strips over axillary incision will fall off on their own over the next 7-10 days. The black sutures over the arm incision will be removed in clinic.      Activity as tolerated     PATIENT INSTRUCTIONS  POST-ANESTHESIA    IMMEDIATELY FOLLOWING SURGERY:  Do not drive or operate machinery for the first twenty four hours after surgery.  Do not make any important decisions for twenty four hours after surgery or while taking narcotic pain medications or sedatives.  If you develop intractable nausea and vomiting or a severe headache please notify your doctor immediately.    FOLLOW-UP:  Please make an appointment with your surgeon as instructed. You do not need to follow up with anesthesia unless specifically instructed to do so.    WOUND CARE INSTRUCTIONS (if applicable):  Keep a dry clean dressing on the anesthesia/puncture wound site if there is drainage.  Once the wound has quit draining you may leave it open to air.  Generally you should leave the bandage intact for twenty four hours unless there is drainage.  If the epidural site drains for more than 36-48 hours please call the anesthesia department.    QUESTIONS?:  Please feel free to call your physician or the hospital  if you have any questions, and they will be happy to assist you.         Recovery After Procedural Sedation (Adult)  You have been given medicine by vein to make you sleep during your surgery. This may have included both a pain medicine and sleeping medicine. Most of the effects have worn off. But you may still have some drowsiness for the next 6 to 8 hours.  Home care  Follow these guidelines when you get home:  · For the next 8 hours, you should be watched by a responsible adult. This person should make sure your condition is not getting worse.  · Don't drink any  alcohol for the next 24 hours.  · Don't drive, operate dangerous machinery, or make important business or personal decisions during the next 24 hours.  Note: Your healthcare provider may tell you not to take any medicine by mouth for pain or sleep in the next 4 hours. These medicines may react with the medicines you were given in the hospital. This could cause a much stronger response than usual.  Follow-up care  Follow up with your healthcare provider if you are not alert and back to your usual level of activity within 12 hours.  When to seek medical advice  Call your healthcare provider right away if any of these occur:  · Drowsiness gets worse  · Weakness or dizziness gets worse  · Repeated vomiting  · You can't be awakened   Date Last Reviewed: 10/18/2016  © 5912-0468 [employer]. [address] [place] [address], [place] [address]. All rights reserved. This information is not intended as a substitute for professional medical care. Always follow your healthcare professional's instructions.

## 2019-01-24 NOTE — ANESTHESIA POSTPROCEDURE EVALUATION
"Anesthesia Post Evaluation    Patient: Joseph Vera    Procedure(s) Performed: Procedure(s) (LRB):  EXCISION-WIDE LOCAL left upper arm just proximal to elbow with left axillary lymph node biopsy. (Left)  BIOPSY, LYMPH NODE, SENTINEL left axillary (Left)    Final Anesthesia Type: general  Patient location during evaluation: PACU  Patient participation: Yes- Able to Participate  Level of consciousness: awake and alert and oriented  Post-procedure vital signs: reviewed and stable  Pain management: adequate  Airway patency: patent  PONV status at discharge: No PONV  Anesthetic complications: no      Cardiovascular status: blood pressure returned to baseline, hemodynamically stable and stable  Respiratory status: unassisted, room air and spontaneous ventilation  Hydration status: euvolemic  Follow-up not needed.        Visit Vitals  BP (!) 156/91   Pulse 86   Temp 36.5 °C (97.7 °F) (Skin)   Resp 14   Ht 5' 10" (1.778 m)   Wt 95.3 kg (210 lb)   SpO2 98%   BMI 30.13 kg/m²       Pain/Michelle Score: Pain Rating Prior to Med Admin: 6 (1/23/2019  6:06 PM)  Michelle Score: 10 (1/23/2019  6:55 PM)        "

## 2019-01-25 NOTE — OP NOTE
DATE OF PROCEDURE:  01/23/2019    PRIMARY SURGEON:  Shaun Gaston M.D.    ASSISTANT SURGEON:  Lenard Pettit M.D. (RES), Surgery resident.    PREOPERATIVE DIAGNOSIS:  At least T1b invasive malignant melanoma of the left   lateral distal upper arm, just proximal to the lateral elbow region with a   broadly positive deep margin on the original biopsy.    POSTOPERATIVE DIAGNOSIS:  At least T1b invasive malignant melanoma of the left   lateral distal upper arm, just proximal to the lateral elbow region with a   broadly positive deep margin on the original biopsy.    PROCEDURES PERFORMED:  Approximately, a 1.5 cm wide local excision of the area   of melanoma of the left upper arm, specimen of resection measuring 3.5 x 10.0 cm   in length with a vertical longitudinally oriented ellipse; injection of   technetium labeled radiocolloid with melanoma protocol intradermal injection and   intradermal isosulfan Lymphazurin blue dye for sentinel lymph node mapping and   identification; identification and mapping of left deep axillary sentinel lymph   nodes x2; left deep axillary excisional sentinel lymph node biopsies x2: primary   closure of the 3.5 x 10.0 cm defect in multiple layers of suture with incision   length on the left upper arm measuring 10 cm in length.    PROCEDURE IN DETAIL:  The patient underwent informed consent.  The history and   physical examination was reviewed and the area of the biopsy site was marked and   confirmed with the patient and his wife in the preop holding area.  The   subcentimeter biopsy area was marked with approximately 1.5 cm margin of   excision on either side, making the width of the ellipse 3.5 cm or so and the   length of the ellipse 10.0 cm or so.  The axilla was marked.  The patient was   brought to the Operating Room under general anesthesia.  He was in a supine   position.  We shaved the left upper extremity and axilla.  We then injected the   4 separate injections of the  melanoma protocol technetium-labeled radiocolloid   intradermally around the prior subcentimeter biopsy site in the left upper arm.    We then injected intradermally approximately 3 mL of isosulfan Lymphazurin blue   dye for sentinel lymph node mapping and identification intradermally within the   anticipated area of resection.  We then prepped and draped the entire left   upper extremity and axilla into a sterile field with a sterile stockinette up to   the wrist and mid forearm to keep the upper arm and axilla in the sterile   field.  We then folded the patient's arm over his torso onto the sterile field   and we performed the wide local excision initially resecting the distal aspect   by incising the skin and dividing the deep dermal and subcutaneous tissue with   cautery, taking this down to the underlying muscular fascia, which was   preserved.  This dissection was advanced cephalad superiorly and proximally to   allow further migration of the blue dye and radiocolloid toward the axilla.  We   then completed the remainder of the wide local excision along the superior sides   of the wound incising the skin sharply and then taking the deep dermal and   subcutaneous layer down to the fascia.  The 3.5 x 10.0 cm specimen was oriented   with a short stitch on the superior proximal long axis of the ellipse and a long   stitch on the anterior short axis of the ellipse.  The specimen was sent to   Pathology for permanent sectioning.  Hemostasis was achieved with cautery.  We   then reapproximated the subcutaneous tissue and superficial fascia with   intermittent 2-0 Vicryl suture with intermittent deep 3-point fixation down to   the posterior deep fascial layer to obliterate any potential dead space and   close the dead space to minimize fluid collection.  The deep dermal layer was   further reapproximated with interrupted 3-0 Vicryl sutures and the skin closed   with 3-0 nylon interrupted vertical mattress  sutures.    We then turned our attention to the left axilla.  The arm was placed back down   on the arm board.  We noted a hot spot in the axilla.  A small transverse   inferior axillary incision was made over the hot spot.  The skin was incised   sharply.  The subcutaneous tissue was divided with cautery.  The clavipectoral   fascia was opened.  The first sentinel lymph node that was identified was hot   and blue and was excised in its entirety.  It had an ex vivo count of   approximately 200.  Once we took this lymph node out, we put the probe back in   the axilla.  There was still significant radioactivity just posterior and medial   to this first sentinel node.  A second hot blue sentinel lymph node was removed   and this was hot and blue with an ex vivo count of 300.  Once both of these   lymph nodes had been removed and were submitted separately as specimens #2 and   #3 for permanent sectioning, the background residual radioactivity counts were   essentially absent and negligible indicating adequate and appropriate sentinel   lymph node mapping, identification and biopsy for axillary staging.  There was   no further blue dye noted in the axilla and no further palpable nodes were   noted.  Hemostasis was achieved.  The clavipectoral fascia was reapproximated   with 3-0 Vicryl suture.  The deep dermal and subcutaneous layers were also   closed with 3-0 Vicryl suture and the skin closed with a running 4-0 Monocryl   subcuticular skin closure.  Dermabond and sterile dressing were applied.    Sterile dressings were applied to both wound sites.  The patient was turned over   to Anesthesia for extubation and transported to the Recovery Area in a   satisfactory condition.  All needle, instrument and sponge counts were correct.    Estimated blood loss was minimal.  No drains were placed.  The patient was   turned over to the Anesthesia team for transport to the Recovery Area in a   satisfactory condition.  Again, all 3  specimens were sent for permanent   sectioning.  Incision length of the primary site was 10 cm for the closure of   the 3.5 x 10.0 cm defect in multiple layers of suture.      TRENTON/IN  dd: 01/25/2019 13:05:57 (CST)  td: 01/25/2019 15:03:12 (CST)  Doc ID   #[id number]  Job ID #[id number]    CC:

## 2019-01-31 ENCOUNTER — OFFICE VISIT (OUTPATIENT)
Dept: SURGERY | Facility: CLINIC | Age: 44
End: 2019-01-31
Payer: OTHER GOVERNMENT

## 2019-01-31 VITALS — SYSTOLIC BLOOD PRESSURE: 133 MMHG | DIASTOLIC BLOOD PRESSURE: 84 MMHG | HEART RATE: 72 BPM | TEMPERATURE: 99 F

## 2019-01-31 DIAGNOSIS — C43.62 MALIGNANT MELANOMA OF LEFT UPPER EXTREMITY INCLUDING SHOULDER: Primary | ICD-10-CM

## 2019-01-31 PROCEDURE — 99024 PR POST-OP FOLLOW-UP VISIT: ICD-10-PCS | Mod: ,,, | Performed by: SURGERY

## 2019-01-31 PROCEDURE — 99212 OFFICE O/P EST SF 10 MIN: CPT | Mod: PBBFAC | Performed by: SURGERY

## 2019-01-31 PROCEDURE — 99999 PR PBB SHADOW E&M-EST. PATIENT-LVL II: ICD-10-PCS | Mod: PBBFAC,,, | Performed by: SURGERY

## 2019-01-31 PROCEDURE — 99024 POSTOP FOLLOW-UP VISIT: CPT | Mod: ,,, | Performed by: SURGERY

## 2019-01-31 PROCEDURE — 99999 PR PBB SHADOW E&M-EST. PATIENT-LVL II: CPT | Mod: PBBFAC,,, | Performed by: SURGERY

## 2019-01-31 NOTE — LETTER
[facility] - General Surgery  [address]  [facility] [address]  Phone: [phone] February 10, 2019      Suki Gudino MD  [address]   [address]  [facility] [address]    Patient: Joseph Vera   MR Number: [id number]   YOB: 1975   Date of Visit: 1/31/2019     Dear Dr. Gudino:    Thank you for referring Joseph Vera to me for evaluation. Attached you will find relevant portions of my assessment and plan of care.    Wound sites both healing well along LUE and left axilla. Nylon sutures removed from LUE. No seroma of left axilla. Pathology report reviewed.    Margins clear and will not need any further therapy following a 1 cm WLE for a T1b, 0.9 mm Breslow depth melanoma of the posterior distal left upper arm.    Patient will f/u with me prn and will follow up with dermatology routinely for whole body skin screening and surveillance exams.    If you have questions, please do not hesitate to call me. I look forward to following Joseph Vera along with you.    Sincerely,    Shaun Gaston MD  Medical Director, [facility]  Staff Attending Surgeon - Department of Surgery  [facility]  Associate Professor of Surgery  [employer]  [employer]    RLC/hcr

## 2019-01-31 NOTE — MEDICAL/APP STUDENT
Subjective:      Joseph Vera is a 43 y.o. male following up post operatively for a wide local excision and sentinel lymph node biopsy on 1/23/19 for a T1a melanoma on his left arm.         Objective:       Pathology demonstrated   1. Skin, left arm, re-excision:  -RESIDUAL MALIGNANT MELANOMA, COMPLETELY EXCISED, see comment  -SURGICAL MARGINS ARE NEGATIVE FOR MALIGNANT MELANOMA  -SCAR (POST-SURGICAL)  COMMENT: The residual malignant melanoma, although invasive, has a shallower depth than the initial biopsy and  therefore the initial biopsy report should be used for staging.  2. Lymph node, left axillary sentinel node #1, excision:  -ONE SENTINEL LYMPH NODE NEGATIVE FOR METASTATIC MELANOMA (0/1)  3. Lymph node, left axillary sentinel node #2, excision:  -TWO SENTINEL LYMPH NODE NEGATIVE FOR METASTATIC MELANOMA (0/2)        Incision on the left arm is healing well. Wound is dry, clean and intact.  Sutures removed today in clinic and dressed with dry gauze.     Vitals:    01/31/19 1627   BP: 133/84   Pulse: 72   Temp: 98.5 °F (36.9 °C)           Plan:       No required follow up from a surgical perspective. He has been told to undergo follow up with his dermatologist. He is moving, so he will be getting a new dermatologist in [place]. Patient was given a printed copy of his pathology results.

## 2019-01-31 NOTE — PROGRESS NOTES
Subjective:     Procedures:  Wide local excision of the left upper extremity proximal to the elbow with neg left axillary lymph node biopsy on 1/25/19     Joseph Vera presents to the clinic 1 weeks following a WLE. Eating a regular diet without difficulty. Bowel movements are Normal.  The patient is not having any pain..      Objective:      /84 (BP Location: Right arm, Patient Position: Sitting, BP Method: Medium (Automatic))   Pulse 72   Temp 98.5 °F (36.9 °C) (Oral)     General:  alert, appears stated age and cooperative   Abdomen: soft, non-tender   Incision:   healing well, no drainage, no erythema, no seroma, no swelling incision well approximated, vertical mattress sutures c/d/i         Surgical pathology 1/23   SPECIMEN  1) Wide local excision T1b melanoma, short superior, long anterior.  2) Left axillary sentinel node #1, hot, blue, count 200.  3) Left axillary sentinel node #2, hot, blue, count 300.  FINAL PATHOLOGIC DIAGNOSIS  1. Skin, left arm, re-excision:  -RESIDUAL MALIGNANT MELANOMA, COMPLETELY EXCISED, see comment  -SURGICAL MARGINS ARE NEGATIVE FOR MALIGNANT MELANOMA  -SCAR (POST-SURGICAL)  COMMENT: The residual malignant melanoma, although invasive, has a shallower depth than the initial biopsy and  therefore the initial biopsy report should be used for staging.  2. Lymph node, left axillary sentinel node #1, excision:  -ONE SENTINEL LYMPH NODE NEGATIVE FOR METASTATIC MELANOMA (0/1)  3. Lymph node, left axillary sentinel node #2, excision:  -TWO SENTINEL LYMPH NODE NEGATIVE FOR METASTATIC MELANOMA (0/2)        Assessment:      Doing well postoperatively.      Plan:      1. Continue any current medications.  2. Wound care discussed, no formal dressing required   3. Pt is to increase activities as tolerated.  4. Follow up:prn, patient moving back to [place], thus sutures removed in clinic today  I have personally taken the history and examined this patient and agree with the resident's  note as stated above.  Wound site both healing well along LUE and left axilla.  Nylon sutures removed from LUE. No seroma of left axilla.  Pathology report reviewed.    Margins clear and will not need any further therapy following a 1 cm WLE for a T1b, 0.9 mm Breslow depth melanoma of the posterior distal left upper arm.  Pt will f/u with me prn and will follow up with dermatology routinely for whole body skin screening and surveillance exams.

## (undated) DEVICE — ELECTRODE BLADE INSULATED 1 IN

## (undated) DEVICE — APPLICATOR CHLORAPREP ORN 26ML

## (undated) DEVICE — NDL HYPO REG 25G X 1 1/2

## (undated) DEVICE — SYR DISP LL 5CC

## (undated) DEVICE — SEE MEDLINE ITEM 152622

## (undated) DEVICE — SYS LABEL CORRECT MED

## (undated) DEVICE — SUT MCRYL PLUS 4-0 PS2 27IN

## (undated) DEVICE — PACK UNIVERSAL SPLIT II

## (undated) DEVICE — NDL 18GA X1 1/2 REG BEVEL

## (undated) DEVICE — ELECTRODE REM PLYHSV RETURN 9

## (undated) DEVICE — TEGADERM IV

## (undated) DEVICE — TRAY MINOR GEN SURG

## (undated) DEVICE — NEOGUARD COVER 4X30CM STERILE

## (undated) DEVICE — DRESSING TELFA STRL 4X3 LF

## (undated) DEVICE — DRESSING TRANS 2X2 TEGADERM

## (undated) DEVICE — CLOSURE SKIN STERI STRIP 1/2X4

## (undated) DEVICE — APPLIER CLIP LIAGCLIP 9.375IN

## (undated) DEVICE — SYR SLIP TIP 1CC

## (undated) DEVICE — CLOSURE SKIN 1/4INX1-1/2IN

## (undated) DEVICE — SPONGE LAP 18X18 PREWASHED

## (undated) DEVICE — ADHESIVE MASTISOL VIAL 48/BX

## (undated) DEVICE — GAUZE FLUFF XXLG 36X36 2 PLY

## (undated) DEVICE — STOCKINET 4INX48

## (undated) DEVICE — DRESSING ABSRBNT ISLAND 3.6X8

## (undated) DEVICE — DRAPE SPLIT STERILE

## (undated) DEVICE — GAUZE SPONGE 4X4 12PLY

## (undated) DEVICE — SEE MEDLINE ITEM 157128

## (undated) DEVICE — SUT VICRYL 3-0 27 SH

## (undated) DEVICE — CONTAINER SPECIMEN STRL 4OZ

## (undated) DEVICE — SUT ETHILON 3/0 18IN PS-1

## (undated) DEVICE — SEE MEDLINE ITEM 146417